# Patient Record
Sex: FEMALE | Race: BLACK OR AFRICAN AMERICAN | Employment: FULL TIME | URBAN - METROPOLITAN AREA
[De-identification: names, ages, dates, MRNs, and addresses within clinical notes are randomized per-mention and may not be internally consistent; named-entity substitution may affect disease eponyms.]

---

## 2019-11-13 ENCOUNTER — HOSPITAL ENCOUNTER (EMERGENCY)
Age: 44
Discharge: HOME | End: 2019-11-13
Attending: EMERGENCY MEDICINE
Payer: COMMERCIAL

## 2019-11-13 VITALS
BODY MASS INDEX: 31.74 KG/M2 | RESPIRATION RATE: 14 BRPM | DIASTOLIC BLOOD PRESSURE: 124 MMHG | HEIGHT: 68 IN | SYSTOLIC BLOOD PRESSURE: 164 MMHG | WEIGHT: 209.44 LBS | HEART RATE: 93 BPM | TEMPERATURE: 98 F | OXYGEN SATURATION: 98 %

## 2019-11-13 DIAGNOSIS — I10 ESSENTIAL HYPERTENSION: ICD-10-CM

## 2019-11-13 DIAGNOSIS — H01.001 BLEPHARITIS OF RIGHT UPPER EYELID, UNSPECIFIED TYPE: Primary | ICD-10-CM

## 2019-11-13 PROCEDURE — 99282 EMERGENCY DEPT VISIT SF MDM: CPT

## 2019-11-13 RX ORDER — HYDROCHLOROTHIAZIDE 25 MG/1
12.5 TABLET ORAL DAILY
Qty: 30 TABLET | Refills: 5 | Status: SHIPPED | OUTPATIENT
Start: 2019-11-13 | End: 2020-02-12 | Stop reason: SDUPTHER

## 2019-11-13 RX ORDER — AMLODIPINE BESYLATE 10 MG/1
10 TABLET ORAL DAILY
Qty: 30 TABLET | Refills: 5 | Status: SHIPPED | OUTPATIENT
Start: 2019-11-13 | End: 2020-02-12 | Stop reason: SDUPTHER

## 2019-11-13 ASSESSMENT — ENCOUNTER SYMPTOMS
EYE REDNESS: 1
SHORTNESS OF BREATH: 0
EYE DISCHARGE: 1
COUGH: 0
SORE THROAT: 0
DIARRHEA: 0
WHEEZING: 0
EYE PAIN: 0
NAUSEA: 0
RHINORRHEA: 0
ABDOMINAL PAIN: 0
BACK PAIN: 0
VOMITING: 0

## 2019-11-13 ASSESSMENT — VISUAL ACUITY
OS: 20/40
OD: 20/40

## 2020-02-12 ENCOUNTER — HOSPITAL ENCOUNTER (EMERGENCY)
Age: 45
Discharge: HOME | End: 2020-02-12
Attending: EMERGENCY MEDICINE
Payer: COMMERCIAL

## 2020-02-12 ENCOUNTER — APPOINTMENT (OUTPATIENT)
Dept: GENERAL RADIOLOGY | Age: 45
End: 2020-02-12
Payer: COMMERCIAL

## 2020-02-12 VITALS
DIASTOLIC BLOOD PRESSURE: 98 MMHG | HEART RATE: 93 BPM | WEIGHT: 205 LBS | OXYGEN SATURATION: 99 % | RESPIRATION RATE: 20 BRPM | TEMPERATURE: 97.7 F | SYSTOLIC BLOOD PRESSURE: 143 MMHG | BODY MASS INDEX: 31.07 KG/M2 | HEIGHT: 68 IN

## 2020-02-12 LAB
ANION GAP SERPL CALCULATED.3IONS-SCNC: 15 MMOL/L (ref 3–16)
BASOPHILS ABSOLUTE: 0 K/UL (ref 0–0.2)
BASOPHILS RELATIVE PERCENT: 0.6 %
BILIRUBIN URINE: NEGATIVE
BLOOD, URINE: NEGATIVE
BUN BLDV-MCNC: 16 MG/DL (ref 7–20)
CALCIUM SERPL-MCNC: 9.1 MG/DL (ref 8.3–10.6)
CHLORIDE BLD-SCNC: 103 MMOL/L (ref 99–110)
CLARITY: CLEAR
CO2: 20 MMOL/L (ref 21–32)
COLOR: YELLOW
CREAT SERPL-MCNC: 1 MG/DL (ref 0.6–1.1)
EKG ATRIAL RATE: 88 BPM
EKG DIAGNOSIS: NORMAL
EKG P AXIS: 55 DEGREES
EKG P-R INTERVAL: 152 MS
EKG Q-T INTERVAL: 364 MS
EKG QRS DURATION: 80 MS
EKG QTC CALCULATION (BAZETT): 440 MS
EKG R AXIS: 44 DEGREES
EKG T AXIS: -2 DEGREES
EKG VENTRICULAR RATE: 88 BPM
EOSINOPHILS ABSOLUTE: 0.1 K/UL (ref 0–0.6)
EOSINOPHILS RELATIVE PERCENT: 1.2 %
GFR AFRICAN AMERICAN: >60
GFR NON-AFRICAN AMERICAN: 59
GLUCOSE BLD-MCNC: 134 MG/DL (ref 70–99)
GLUCOSE URINE: NEGATIVE MG/DL
HCG QUALITATIVE: NEGATIVE
HCT VFR BLD CALC: 42.7 % (ref 36–48)
HEMOGLOBIN: 14.7 G/DL (ref 12–16)
KETONES, URINE: NEGATIVE MG/DL
LEUKOCYTE ESTERASE, URINE: NEGATIVE
LYMPHOCYTES ABSOLUTE: 2.6 K/UL (ref 1–5.1)
LYMPHOCYTES RELATIVE PERCENT: 36.5 %
MCH RBC QN AUTO: 34.5 PG (ref 26–34)
MCHC RBC AUTO-ENTMCNC: 34.3 G/DL (ref 31–36)
MCV RBC AUTO: 100.6 FL (ref 80–100)
MICROSCOPIC EXAMINATION: NORMAL
MONOCYTES ABSOLUTE: 0.4 K/UL (ref 0–1.3)
MONOCYTES RELATIVE PERCENT: 5.5 %
NEUTROPHILS ABSOLUTE: 4 K/UL (ref 1.7–7.7)
NEUTROPHILS RELATIVE PERCENT: 56.2 %
NITRITE, URINE: NEGATIVE
PDW BLD-RTO: 13.7 % (ref 12.4–15.4)
PH UA: 6 (ref 5–8)
PLATELET # BLD: 220 K/UL (ref 135–450)
PMV BLD AUTO: 9.5 FL (ref 5–10.5)
POTASSIUM REFLEX MAGNESIUM: 3.9 MMOL/L (ref 3.5–5.1)
PROTEIN UA: NEGATIVE MG/DL
RBC # BLD: 4.25 M/UL (ref 4–5.2)
SODIUM BLD-SCNC: 138 MMOL/L (ref 136–145)
SPECIFIC GRAVITY UA: 1.02 (ref 1–1.03)
TROPONIN: <0.01 NG/ML
URINE REFLEX TO CULTURE: NORMAL
URINE TYPE: NORMAL
UROBILINOGEN, URINE: 0.2 E.U./DL
WBC # BLD: 7.2 K/UL (ref 4–11)

## 2020-02-12 PROCEDURE — 84703 CHORIONIC GONADOTROPIN ASSAY: CPT

## 2020-02-12 PROCEDURE — 80048 BASIC METABOLIC PNL TOTAL CA: CPT

## 2020-02-12 PROCEDURE — 84484 ASSAY OF TROPONIN QUANT: CPT

## 2020-02-12 PROCEDURE — 85025 COMPLETE CBC W/AUTO DIFF WBC: CPT

## 2020-02-12 PROCEDURE — 99285 EMERGENCY DEPT VISIT HI MDM: CPT

## 2020-02-12 PROCEDURE — 2580000003 HC RX 258: Performed by: PHYSICIAN ASSISTANT

## 2020-02-12 PROCEDURE — 71046 X-RAY EXAM CHEST 2 VIEWS: CPT

## 2020-02-12 PROCEDURE — 6370000000 HC RX 637 (ALT 250 FOR IP): Performed by: PHYSICIAN ASSISTANT

## 2020-02-12 PROCEDURE — 81003 URINALYSIS AUTO W/O SCOPE: CPT

## 2020-02-12 PROCEDURE — 93005 ELECTROCARDIOGRAM TRACING: CPT | Performed by: PHYSICIAN ASSISTANT

## 2020-02-12 RX ORDER — AMLODIPINE BESYLATE 10 MG/1
10 TABLET ORAL DAILY
Qty: 30 TABLET | Refills: 5 | Status: SHIPPED | OUTPATIENT
Start: 2020-02-12

## 2020-02-12 RX ORDER — 0.9 % SODIUM CHLORIDE 0.9 %
1000 INTRAVENOUS SOLUTION INTRAVENOUS ONCE
Status: COMPLETED | OUTPATIENT
Start: 2020-02-12 | End: 2020-02-12

## 2020-02-12 RX ORDER — IBUPROFEN 400 MG/1
800 TABLET ORAL ONCE
Status: COMPLETED | OUTPATIENT
Start: 2020-02-12 | End: 2020-02-12

## 2020-02-12 RX ORDER — ADENOSINE 3 MG/ML
6 INJECTION, SOLUTION INTRAVENOUS ONCE
Status: DISCONTINUED | OUTPATIENT
Start: 2020-02-12 | End: 2020-02-12

## 2020-02-12 RX ORDER — ADENOSINE 3 MG/ML
INJECTION, SOLUTION INTRAVENOUS
Status: DISCONTINUED
Start: 2020-02-12 | End: 2020-02-12 | Stop reason: HOSPADM

## 2020-02-12 RX ORDER — HYDROCHLOROTHIAZIDE 25 MG/1
12.5 TABLET ORAL DAILY
Qty: 30 TABLET | Refills: 5 | Status: SHIPPED | OUTPATIENT
Start: 2020-02-12

## 2020-02-12 RX ADMIN — IBUPROFEN 800 MG: 400 TABLET, FILM COATED ORAL at 11:43

## 2020-02-12 RX ADMIN — SODIUM CHLORIDE 1000 ML: 9 INJECTION, SOLUTION INTRAVENOUS at 09:58

## 2020-02-12 ASSESSMENT — ENCOUNTER SYMPTOMS
EYE PAIN: 0
ABDOMINAL PAIN: 0
CHEST TIGHTNESS: 1
SHORTNESS OF BREATH: 0
BACK PAIN: 0
COUGH: 0
NAUSEA: 0
VOMITING: 0
DIARRHEA: 0
PHOTOPHOBIA: 0

## 2020-02-12 ASSESSMENT — PAIN SCALES - GENERAL: PAINLEVEL_OUTOF10: 8

## 2020-02-12 NOTE — ED NOTES
Peripheral IV placed. Positive blood return and flushed with 10 ml sterile saline without difficulty. Patient tolerated procedure well. Site intact with no redness, swelling, or pain at site.         Crista Patricia RN  02/12/20 1885

## 2020-02-12 NOTE — ED PROVIDER NOTES
Family, and Social History     She has a past medical history of Hypertension. She has no past surgical history on file. Her family history is not on file. She reports that she has been smoking. She has a 12.00 pack-year smoking history. She has never used smokeless tobacco. She reports that she does not drink alcohol or use drugs. Medications     Discharge Medication List as of 2/12/2020 12:57 PM          Allergies     She has No Known Allergies. Physical Exam     INITIAL VITALS: BP: (!) 155/101, Temp: 97.7 °F (36.5 °C), Pulse: 130, Resp: 15, SpO2: 100 %  Physical Exam  Constitutional:       General: She is not in acute distress. Appearance: She is well-developed. HENT:      Head: Normocephalic and atraumatic. Eyes:      Pupils: Pupils are equal, round, and reactive to light. Neck:      Musculoskeletal: Normal range of motion and neck supple. Cardiovascular:      Rate and Rhythm: Regular rhythm. Tachycardia present. Heart sounds: No murmur. No friction rub. No gallop. Pulmonary:      Effort: Pulmonary effort is normal. No respiratory distress. Breath sounds: Normal breath sounds. Abdominal:      Palpations: Abdomen is soft. Tenderness: There is no abdominal tenderness. Musculoskeletal: Normal range of motion. Skin:     General: Skin is warm and dry. Neurological:      Mental Status: She is alert and oriented to person, place, and time. Diagnostic Results     EKG   Interpreted in conjunction with emergency department physician Ian Mcmillan, *  Rhythm: paroxismal supraventricular tachycardia  Rate: tachycardia and 140-150  Axis: normal  Ectopy: none  Conduction: normal  ST Segments: normal  T Waves: normal  Q Waves:nonspecific  Clinical Impression: supraventricular tachycardia  Comparison:  None    Repeat EKG was obtained at 10:51 AM and showed normal sinus rhythm with a ventricular rate of 88.   No ST or T wave changes concerning for ischemia.     RADIOLOGY:  XR CHEST STANDARD (2 VW)   Final Result      No acute findings          LABS:   Results for orders placed or performed during the hospital encounter of 02/12/20   Troponin   Result Value Ref Range    Troponin <0.01 <0.01 ng/mL   Basic Metabolic Panel w/ Reflex to MG   Result Value Ref Range    Sodium 138 136 - 145 mmol/L    Potassium reflex Magnesium 3.9 3.5 - 5.1 mmol/L    Chloride 103 99 - 110 mmol/L    CO2 20 (L) 21 - 32 mmol/L    Anion Gap 15 3 - 16    Glucose 134 (H) 70 - 99 mg/dL    BUN 16 7 - 20 mg/dL    CREATININE 1.0 0.6 - 1.1 mg/dL    GFR Non- 59 (A) >60    GFR African American >60 >60    Calcium 9.1 8.3 - 10.6 mg/dL   CBC Auto Differential   Result Value Ref Range    WBC 7.2 4.0 - 11.0 K/uL    RBC 4.25 4.00 - 5.20 M/uL    Hemoglobin 14.7 12.0 - 16.0 g/dL    Hematocrit 42.7 36.0 - 48.0 %    .6 (H) 80.0 - 100.0 fL    MCH 34.5 (H) 26.0 - 34.0 pg    MCHC 34.3 31.0 - 36.0 g/dL    RDW 13.7 12.4 - 15.4 %    Platelets 565 362 - 967 K/uL    MPV 9.5 5.0 - 10.5 fL    Neutrophils % 56.2 %    Lymphocytes % 36.5 %    Monocytes % 5.5 %    Eosinophils % 1.2 %    Basophils % 0.6 %    Neutrophils Absolute 4.0 1.7 - 7.7 K/uL    Lymphocytes Absolute 2.6 1.0 - 5.1 K/uL    Monocytes Absolute 0.4 0.0 - 1.3 K/uL    Eosinophils Absolute 0.1 0.0 - 0.6 K/uL    Basophils Absolute 0.0 0.0 - 0.2 K/uL   Urinalysis Reflex to Culture   Result Value Ref Range    Color, UA Yellow Straw/Yellow    Clarity, UA Clear Clear    Glucose, Ur Negative Negative mg/dL    Bilirubin Urine Negative Negative    Ketones, Urine Negative Negative mg/dL    Specific Gravity, UA 1.020 1.005 - 1.030    Blood, Urine Negative Negative    pH, UA 6.0 5.0 - 8.0    Protein, UA Negative Negative mg/dL    Urobilinogen, Urine 0.2 <2.0 E.U./dL    Nitrite, Urine Negative Negative    Leukocyte Esterase, Urine Negative Negative    Microscopic Examination Not Indicated     Urine Type NotGiven     Urine Reflex to Culture Not Indicated    HCG Qualitative, Serum   Result Value Ref Range    hCG Qual Negative Detects HCG level >10 MIU/mL   EKG 12 Lead   Result Value Ref Range    Ventricular Rate 88 BPM    Atrial Rate 88 BPM    P-R Interval 152 ms    QRS Duration 80 ms    Q-T Interval 364 ms    QTc Calculation (Bazett) 440 ms    P Axis 55 degrees    R Axis 44 degrees    T Axis -2 degrees    Diagnosis       EKG performed in ER and to be interpreted by ER physician. Confirmed by MD, ER ([id number]),  Vish Rangel ([id number]) on 2/12/2020 10:58:52 AM     RECENT VITALS:  BP: (!) 143/98, Temp: 97.7 °F (36.5 °C), Pulse: 93, Resp: 20, SpO2: 99 %     ED Course     Nursing Notes, Past Medical Hx,Past Surgical Hx, Social Hx, Allergies, and Family Hx were reviewed. The patient was given the following medications:  Orders Placed This Encounter   Medications    DISCONTD: adenosine (ADENOCARD) 6 MG/2ML injection     MARY ALLEN: cabinet override    0.9 % sodium chloride bolus    DISCONTD: adenosine (ADENOCARD) injection 6 mg    ibuprofen (ADVIL;MOTRIN) tablet 800 mg    amLODIPine (NORVASC) 10 MG tablet     Sig: Take 1 tablet by mouth daily     Dispense:  30 tablet     Refill:  5    hydrochlorothiazide (HYDRODIURIL) 25 MG tablet     Sig: Take 0.5 tablets by mouth daily     Dispense:  30 tablet     Refill:  5       CONSULTS:  None    MEDICAL DECISION MAKING / ASSESSMENT / Tishgaurav Alvarenga is a 53 y.o. female who presents the emergency department with palpitations, lightheadedness, and chest tightness. Patient was tachycardic to 140-150 on presentation, all other vital signs were stable. Thorough history and physical exam was performed as detailed above. Patient presents the emergency department with sudden onset palpitations, lightheadedness, and chest tightness while at work this morning.   Upon my assessment she states all her symptoms have resolved, however she is still tachycardic up to 150 bpm.  She denies any history of similar symptoms in the past.  She states she has been under a lot of stress recently. Denies any significant caffeine use. Denies any history of cardiac disease. Patient does have a history of hypertension, however has not had her medication in the last month. EKG was initially concerning for supraventricular tachycardia with a heart rate of 149 bpm.  CBC, BMP, troponin, chest x-ray, serum pregnancy, urinalysis all came back unremarkable. I initially ordered 1 L of fluids along with 6 mg of adenosine given the patient's SVT. Shortly after fluids were started, she converted spontaneously back to sinus rhythm. Adenosine did not have to be given. Repeat EKG showed normal sinus rhythm with no ST or T wave changes concerning for ischemia with a heart rate of 88. Patient was observed for an extended period of time after she converted to normal sinus rhythm without any recurrence of supraventricular tachycardia. At this time, I believe the patient is stable to be discharged to follow-up with cardiology as an outpatient. She was given the contact information for the on-call cardiologist today. She was told to follow-up with her primary care provider as an outpatient to discuss her symptoms. She was given refills of both of her blood pressure medications. The plan was thoroughly discussed with the patient she is agreeable at this time. She was given strict return precautions and discharged home. This patient was also evaluated by the attending physician. All care plans were discussed and agreed upon. Clinical Impression     1.  Paroxysmal supraventricular tachycardia [facility])        Disposition     PATIENT REFERRED TO:  [facility] Emergency Department  [address] [place] [id number]  [phone]  Go to   If symptoms worsen    Sergei Dubois MD  [address] [facility],[address]  [phone]    Schedule an appointment as soon as possible for a visit         DISCHARGE MEDICATIONS:  Discharge Medication List as of 2/12/2020 12:57 PM          DISPOSITION Decision To Discharge 02/12/2020 12:47:54 PM        Violeta Sánchez PA-C  02/12/20 1740

## 2025-04-15 ENCOUNTER — HOSPITAL ENCOUNTER (INPATIENT)
Age: 50
LOS: 1 days | Discharge: HOME | DRG: 310 | End: 2025-04-17
Attending: EMERGENCY MEDICINE | Admitting: INTERNAL MEDICINE

## 2025-04-15 DIAGNOSIS — I47.10 SVT (SUPRAVENTRICULAR TACHYCARDIA): Primary | ICD-10-CM

## 2025-04-15 DIAGNOSIS — R94.31 ABNORMAL ELECTROCARDIOGRAPHY: ICD-10-CM

## 2025-04-15 DIAGNOSIS — R79.89 TROPONIN LEVEL ELEVATED: ICD-10-CM

## 2025-04-15 PROBLEM — R07.9 CHEST PAIN: Status: ACTIVE | Noted: 2025-04-15

## 2025-04-15 LAB
ALBUMIN SERPL-MCNC: 3.6 G/DL (ref 3.4–5)
ALBUMIN/GLOB SERPL: 1.2 {RATIO} (ref 1.1–2.2)
ALP SERPL-CCNC: 92 U/L (ref 40–129)
ALT SERPL-CCNC: 10 U/L (ref 10–40)
ANION GAP SERPL CALCULATED.3IONS-SCNC: 11 MMOL/L (ref 3–16)
AST SERPL-CCNC: 17 U/L (ref 15–37)
BASOPHILS # BLD: 0.1 K/UL (ref 0–0.2)
BASOPHILS NFR BLD: 0.7 %
BILIRUB SERPL-MCNC: <0.2 MG/DL (ref 0–1)
BUN SERPL-MCNC: 21 MG/DL (ref 7–20)
CALCIUM SERPL-MCNC: 9.1 MG/DL (ref 8.3–10.6)
CHLORIDE SERPL-SCNC: 110 MMOL/L (ref 99–110)
CO2 SERPL-SCNC: 19 MMOL/L (ref 21–32)
CREAT SERPL-MCNC: 1.2 MG/DL (ref 0.6–1.1)
DEPRECATED RDW RBC AUTO: 14.1 % (ref 12.4–15.4)
EKG ATRIAL RATE: 136 BPM
EKG ATRIAL RATE: 94 BPM
EKG DIAGNOSIS: NORMAL
EKG DIAGNOSIS: NORMAL
EKG P AXIS: 48 DEGREES
EKG P-R INTERVAL: 146 MS
EKG Q-T INTERVAL: 302 MS
EKG Q-T INTERVAL: 354 MS
EKG QRS DURATION: 76 MS
EKG QRS DURATION: 78 MS
EKG QTC CALCULATION (BAZETT): 442 MS
EKG QTC CALCULATION (BAZETT): 462 MS
EKG R AXIS: 21 DEGREES
EKG R AXIS: 40 DEGREES
EKG T AXIS: 1 DEGREES
EKG T AXIS: 29 DEGREES
EKG VENTRICULAR RATE: 141 BPM
EKG VENTRICULAR RATE: 94 BPM
EOSINOPHIL # BLD: 0.1 K/UL (ref 0–0.6)
EOSINOPHIL NFR BLD: 1.6 %
GFR SERPLBLD CREATININE-BSD FMLA CKD-EPI: 54 ML/MIN/{1.73_M2}
GLUCOSE BLD-MCNC: 103 MG/DL (ref 70–99)
GLUCOSE SERPL-MCNC: 113 MG/DL (ref 70–99)
HCT VFR BLD AUTO: 42.9 % (ref 36–48)
HGB BLD-MCNC: 14.7 G/DL (ref 12–16)
LYMPHOCYTES # BLD: 3.5 K/UL (ref 1–5.1)
LYMPHOCYTES NFR BLD: 39.1 %
MCH RBC QN AUTO: 34.7 PG (ref 26–34)
MCHC RBC AUTO-ENTMCNC: 34.2 G/DL (ref 31–36)
MCV RBC AUTO: 101.4 FL (ref 80–100)
MONOCYTES # BLD: 0.6 K/UL (ref 0–1.3)
MONOCYTES NFR BLD: 6.3 %
NEUTROPHILS # BLD: 4.7 K/UL (ref 1.7–7.7)
NEUTROPHILS NFR BLD: 52.3 %
PERFORMED ON: ABNORMAL
PLATELET # BLD AUTO: 215 K/UL (ref 135–450)
PMV BLD AUTO: 9.5 FL (ref 5–10.5)
POTASSIUM SERPL-SCNC: 3.9 MMOL/L (ref 3.5–5.1)
PROT SERPL-MCNC: 6.7 G/DL (ref 6.4–8.2)
RBC # BLD AUTO: 4.23 M/UL (ref 4–5.2)
SODIUM SERPL-SCNC: 140 MMOL/L (ref 136–145)
TROPONIN, HIGH SENSITIVITY: 10 NG/L (ref 0–14)
TROPONIN, HIGH SENSITIVITY: 26 NG/L (ref 0–14)
TROPONIN, HIGH SENSITIVITY: 26 NG/L (ref 0–14)
TROPONIN, HIGH SENSITIVITY: 40 NG/L (ref 0–14)
WBC # BLD AUTO: 8.9 K/UL (ref 4–11)

## 2025-04-15 PROCEDURE — G0378 HOSPITAL OBSERVATION PER HR: HCPCS

## 2025-04-15 PROCEDURE — 93005 ELECTROCARDIOGRAM TRACING: CPT | Performed by: NURSE PRACTITIONER

## 2025-04-15 PROCEDURE — 2500000003 HC RX 250 WO HCPCS: Performed by: INTERNAL MEDICINE

## 2025-04-15 PROCEDURE — 6360000002 HC RX W HCPCS: Performed by: NURSE PRACTITIONER

## 2025-04-15 PROCEDURE — 84484 ASSAY OF TROPONIN QUANT: CPT

## 2025-04-15 PROCEDURE — 85025 COMPLETE CBC W/AUTO DIFF WBC: CPT

## 2025-04-15 PROCEDURE — 80053 COMPREHEN METABOLIC PANEL: CPT

## 2025-04-15 PROCEDURE — 99285 EMERGENCY DEPT VISIT HI MDM: CPT

## 2025-04-15 PROCEDURE — 6360000002 HC RX W HCPCS: Performed by: INTERNAL MEDICINE

## 2025-04-15 PROCEDURE — 96374 THER/PROPH/DIAG INJ IV PUSH: CPT

## 2025-04-15 PROCEDURE — 2580000003 HC RX 258: Performed by: NURSE PRACTITIONER

## 2025-04-15 RX ORDER — ENOXAPARIN SODIUM 100 MG/ML
40 INJECTION SUBCUTANEOUS DAILY
Status: DISCONTINUED | OUTPATIENT
Start: 2025-04-15 | End: 2025-04-17 | Stop reason: HOSPADM

## 2025-04-15 RX ORDER — ACETAMINOPHEN 325 MG/1
650 TABLET ORAL EVERY 6 HOURS PRN
Status: DISCONTINUED | OUTPATIENT
Start: 2025-04-15 | End: 2025-04-17 | Stop reason: HOSPADM

## 2025-04-15 RX ORDER — ACETAMINOPHEN 650 MG/1
650 SUPPOSITORY RECTAL EVERY 6 HOURS PRN
Status: DISCONTINUED | OUTPATIENT
Start: 2025-04-15 | End: 2025-04-17 | Stop reason: HOSPADM

## 2025-04-15 RX ORDER — POTASSIUM CHLORIDE 1500 MG/1
40 TABLET, EXTENDED RELEASE ORAL PRN
Status: DISCONTINUED | OUTPATIENT
Start: 2025-04-15 | End: 2025-04-17 | Stop reason: HOSPADM

## 2025-04-15 RX ORDER — AMLODIPINE BESYLATE 10 MG/1
10 TABLET ORAL DAILY
Status: DISCONTINUED | OUTPATIENT
Start: 2025-04-16 | End: 2025-04-16

## 2025-04-15 RX ORDER — SODIUM CHLORIDE 0.9 % (FLUSH) 0.9 %
5-40 SYRINGE (ML) INJECTION EVERY 12 HOURS SCHEDULED
Status: DISCONTINUED | OUTPATIENT
Start: 2025-04-15 | End: 2025-04-17 | Stop reason: HOSPADM

## 2025-04-15 RX ORDER — ONDANSETRON 4 MG/1
4 TABLET, ORALLY DISINTEGRATING ORAL EVERY 8 HOURS PRN
Status: DISCONTINUED | OUTPATIENT
Start: 2025-04-15 | End: 2025-04-17 | Stop reason: HOSPADM

## 2025-04-15 RX ORDER — MAGNESIUM SULFATE IN WATER 40 MG/ML
2000 INJECTION, SOLUTION INTRAVENOUS PRN
Status: DISCONTINUED | OUTPATIENT
Start: 2025-04-15 | End: 2025-04-17 | Stop reason: HOSPADM

## 2025-04-15 RX ORDER — LABETALOL HYDROCHLORIDE 5 MG/ML
10 INJECTION, SOLUTION INTRAVENOUS
Status: COMPLETED | OUTPATIENT
Start: 2025-04-15 | End: 2025-04-15

## 2025-04-15 RX ORDER — SODIUM CHLORIDE, SODIUM LACTATE, POTASSIUM CHLORIDE, AND CALCIUM CHLORIDE .6; .31; .03; .02 G/100ML; G/100ML; G/100ML; G/100ML
1000 INJECTION, SOLUTION INTRAVENOUS ONCE
Status: COMPLETED | OUTPATIENT
Start: 2025-04-15 | End: 2025-04-15

## 2025-04-15 RX ORDER — ADENOSINE 3 MG/ML
6 INJECTION, SOLUTION INTRAVENOUS ONCE
Status: COMPLETED | OUTPATIENT
Start: 2025-04-15 | End: 2025-04-15

## 2025-04-15 RX ORDER — ONDANSETRON 2 MG/ML
4 INJECTION INTRAMUSCULAR; INTRAVENOUS EVERY 6 HOURS PRN
Status: DISCONTINUED | OUTPATIENT
Start: 2025-04-15 | End: 2025-04-17 | Stop reason: HOSPADM

## 2025-04-15 RX ORDER — ASPIRIN 81 MG/1
81 TABLET, CHEWABLE ORAL DAILY
Status: DISCONTINUED | OUTPATIENT
Start: 2025-04-16 | End: 2025-04-17 | Stop reason: HOSPADM

## 2025-04-15 RX ORDER — SODIUM CHLORIDE 9 MG/ML
INJECTION, SOLUTION INTRAVENOUS PRN
Status: DISCONTINUED | OUTPATIENT
Start: 2025-04-15 | End: 2025-04-17 | Stop reason: HOSPADM

## 2025-04-15 RX ORDER — SODIUM CHLORIDE 0.9 % (FLUSH) 0.9 %
5-40 SYRINGE (ML) INJECTION PRN
Status: DISCONTINUED | OUTPATIENT
Start: 2025-04-15 | End: 2025-04-17 | Stop reason: HOSPADM

## 2025-04-15 RX ORDER — HYDROCHLOROTHIAZIDE 25 MG/1
12.5 TABLET ORAL DAILY
Status: DISCONTINUED | OUTPATIENT
Start: 2025-04-16 | End: 2025-04-17 | Stop reason: HOSPADM

## 2025-04-15 RX ORDER — POLYETHYLENE GLYCOL 3350 17 G/17G
17 POWDER, FOR SOLUTION ORAL DAILY PRN
Status: DISCONTINUED | OUTPATIENT
Start: 2025-04-15 | End: 2025-04-17 | Stop reason: HOSPADM

## 2025-04-15 RX ORDER — POTASSIUM CHLORIDE 7.45 MG/ML
10 INJECTION INTRAVENOUS PRN
Status: DISCONTINUED | OUTPATIENT
Start: 2025-04-15 | End: 2025-04-17 | Stop reason: HOSPADM

## 2025-04-15 RX ADMIN — ADENOSINE 6 MG: 3 INJECTION, SOLUTION INTRAVENOUS at 14:25

## 2025-04-15 RX ADMIN — SODIUM CHLORIDE, SODIUM LACTATE, POTASSIUM CHLORIDE, AND CALCIUM CHLORIDE 1000 ML: .6; .31; .03; .02 INJECTION, SOLUTION INTRAVENOUS at 14:16

## 2025-04-15 RX ADMIN — SODIUM CHLORIDE, SODIUM LACTATE, POTASSIUM CHLORIDE, AND CALCIUM CHLORIDE 1000 ML: .6; .31; .03; .02 INJECTION, SOLUTION INTRAVENOUS at 16:03

## 2025-04-15 RX ADMIN — LABETALOL HYDROCHLORIDE 10 MG: 5 INJECTION, SOLUTION INTRAVENOUS at 21:22

## 2025-04-15 RX ADMIN — SODIUM CHLORIDE, PRESERVATIVE FREE 10 ML: 5 INJECTION INTRAVENOUS at 21:21

## 2025-04-15 RX ADMIN — ENOXAPARIN SODIUM 40 MG: 100 INJECTION SUBCUTANEOUS at 21:32

## 2025-04-15 ASSESSMENT — PAIN SCALES - GENERAL: PAINLEVEL_OUTOF10: 6

## 2025-04-15 ASSESSMENT — PAIN - FUNCTIONAL ASSESSMENT: PAIN_FUNCTIONAL_ASSESSMENT: 0-10

## 2025-04-15 ASSESSMENT — PAIN DESCRIPTION - LOCATION: LOCATION: CHEST

## 2025-04-15 NOTE — ED NOTES
Attending and NP bedside, reviewed cardioversion with patient. Crash cart bedside and pt on zoll.      Anne Matias, RN  04/15/25 8548

## 2025-04-15 NOTE — ED PROVIDER NOTES
THE [facility]  EMERGENCY DEPARTMENT ENCOUNTER          PHYSICIAN ASSISTANT NOTE     Date of evaluation: 4/15/2025    ADDENDUM:      Care of this patient was assumed from Rayne Bender NP.  The patient was seen for Tachycardia (T presents to the ED due to palpitations and tachycardia. Pt states she was at work and then felt her heart rcaing. Pt states this has happened in the  past. PMH of HBP, pt is on medications but is having issues with her insurance. )  .  The patient's initial evaluation and plan have been discussed with the prior provider who initially evaluated the patient.  Nursing Notes, Past Medical Hx, Past Surgical Hx, Social Hx, Allergies, and Family Hx were all reviewed.    ASSESSMENT / PLAN  (MEDICAL DECISION MAKING)     Simi Iglesias is a 50 y.o. female with a PMH of HTN and SVT.  At time of signout, repeat troponin pending.  Ultimately patient arrived in SVT.  She received 6 mg of adenosine which improved her rate and has been controlled since.  Troponins increased from 10 up to 26.  She then received a liter of fluids.  After her liter of fluids an additional troponin was ordered and increased to 40.     Patient has not had previous cardiac workup.  At this time, patient will be admitted for cardiac workup and  trend of troponin.  Patient is agreeable to admission.  Patient will be cared for in the ER until bed becomes available upstairs.    Is this patient to be included in the SEP-1 core measure? No Exclusion criteria - the patient is NOT to be included for SEP-1 Core Measure due to: Infection is not suspected    Medical Decision Making  Amount and/or Complexity of Data Reviewed  Labs: ordered.  ECG/medicine tests: ordered.    Risk  Prescription drug management.  Decision regarding hospitalization.        This patient was also evaluated by the attending physician. All care plans were discussed and agreed upon.    Clinical Impression     1. SVT (supraventricular tachycardia)    2.

## 2025-04-15 NOTE — ED NOTES
Patient Name: Simi Iglesias  : 1975 50 y.o.  MRN: [id number]  ED Room #: A04/A04-04     Chief complaint:   Chief Complaint   Patient presents with    Tachycardia     T presents to the ED due to palpitations and tachycardia. Pt states she was at work and then felt her heart rcaing. Pt states this has happened in the  past. PMH of [facility], pt is on medications but is having issues with her insurance.      Hospital Problem/Diagnosis:   Hospital Problems           Last Modified POA    * (Principal) Chest pain 4/15/2025 Yes         O2 Flow Rate:O2 Device: None (Room air)   (if applicable)  Cardiac Rhythm:   (if applicable)  Active LDA's:   Peripheral IV 04/15/25 Right Forearm (Active)   Site Assessment Clean, dry & intact 04/15/25 132   Line Status Blood return noted 04/15/25 1325            How does patient ambulate? Low Fall Risk (Ambulates by themselves without support    2. How does patient take pills? Whole with Water    3. Is patient alert? Alert    4. Is patient oriented? To Person, To Place, To Time, To Situation, and Follows Commands    5.   Patient arrived from:  home  Facility Name: ___________________________________________    6. If patient is disoriented or from a Skill Nursing Facility has family been notified of admission?  NA    7. Patient belongings? Belongings: Cell Phone, Wallet, and Clothing    Disposition of belongings? Kept with Patient     8. Any specific patient or family belongings/needs/dynamics?   a. NA    9. Miscellaneous comments/pending orders?  a. NA     If there are any additional questions please reach out to the Emergency Department.      Simba Diaz, RN  04/15/25 1953

## 2025-04-15 NOTE — ED NOTES
Provider notified of SVT on EKG. Provider bedside for assessment.      Anne Matias, RN  04/15/25 8048

## 2025-04-15 NOTE — ED PROVIDER NOTES
ED Attending Attestation Note     Date of evaluation: 4/15/2025    This patient was seen by the resident.  I have seen and examined the patient, agree with the workup, evaluation, management and diagnosis. The care plan has been discussed.  I have reviewed the ECG and concur with the resident's interpretation.      Briefly, Simi Iglesias is a 50 y.o. female presenting for evaluation of palpitations.  Has had SVT previously.    Notable exam findings include tachycardia with rate approximately 150.  SVT on EKG.  Blood pressure stable.  Normal mental status.    Assessment/ Medical Decision Making:     SVT did not resolve with vagal maneuvers.  I have reviewed her medical record.  Patient has received adenosine previously and does not have evidence on Chago-Parkinson-White on her baseline EKG.  She was given 6 mg of adenosine with conversion to sinus rhythm. I supervised this procedure.  Troponin mildly elevated but believe this is secondary to demand ischemia from prolonged tachycardia.  Repeat is pending.       Nikita Garcia MD  04/17/25 2125

## 2025-04-15 NOTE — ED PROVIDER NOTES
THE [facility]  EMERGENCY DEPARTMENT ENCOUNTER          NURSE PRACTITIONER NOTE     Date of evaluation: 4/15/2025    Chief Complaint     Tachycardia (T presents to the ED due to palpitations and tachycardia. Pt states she was at work and then felt her heart rcaing. Pt states this has happened in the  past. PMH of [facility], pt is on medications but is having issues with her insurance. )    History of Present Illness     Siim Iglesias is a 50 y.o. female with a past medical history of hypertension and SVT, previously requiring adenosine, but has had SVT in several years who presents to the emergency department with complaints of palpitations.  Started when she was trying to warm up her food at work.  Complains of feeling palpitations, feeling like her heart is racing out of her chest, and mild anxiety, but denies any chest pain, shortness of breath, dizziness, lightheadedness, syncope or near syncope.  Reports she has been in her normal state of health with no recent viral symptoms, nausea, vomiting, diarrhea, fevers, chills or other symptomatology.    With the exception of the above, there are no aggravating or alleviating factors.    Review of Systems     Pertinent positive and negative findings as documented above in HPI, otherwise all other systems were reviewed and negative     Past Medical, Surgical, Family, and Social History     Medical History:   Past Medical History:   Diagnosis Date    Hypertension        Surgical History: No past surgical history on file.    Social History: She reports that she has been smoking. She has a 12 pack-year smoking history. She has never used smokeless tobacco. She reports that she does not drink alcohol and does not use drugs.    Family History: Her family history is not on file.    Medications     Previous Medications    AMLODIPINE (NORVASC) 10 MG TABLET    Take 1 tablet by mouth daily    HYDROCHLOROTHIAZIDE (HYDRODIURIL) 25 MG TABLET    Take 0.5 tablets by mouth daily     Eosinophils Absolute 0.1 0.0 - 0.6 K/uL    Basophils Absolute 0.1 0.0 - 0.2 K/uL   Comprehensive Metabolic Panel w/ Reflex to MG   Result Value Ref Range    Sodium 140 136 - 145 mmol/L    Potassium reflex Magnesium 3.9 3.5 - 5.1 mmol/L    Chloride 110 99 - 110 mmol/L    CO2 19 (L) 21 - 32 mmol/L    Anion Gap 11 3 - 16    Glucose 113 (H) 70 - 99 mg/dL    BUN 21 (H) 7 - 20 mg/dL    Creatinine 1.2 (H) 0.6 - 1.1 mg/dL    Est, Glom Filt Rate 54 (A) >60    Calcium 9.1 8.3 - 10.6 mg/dL    Total Protein 6.7 6.4 - 8.2 g/dL    Albumin 3.6 3.4 - 5.0 g/dL    Albumin/Globulin Ratio 1.2 1.1 - 2.2    Total Bilirubin <0.2 0.0 - 1.0 mg/dL    Alkaline Phosphatase 92 40 - 129 U/L    ALT 10 10 - 40 U/L    AST 17 15 - 37 U/L   Troponin   Result Value Ref Range    Troponin, High Sensitivity 10 0 - 14 ng/L   Troponin   Result Value Ref Range    Troponin, High Sensitivity 26 (H) 0 - 14 ng/L   EKG 12 Lead   Result Value Ref Range    Ventricular Rate 141 BPM    Atrial Rate 136 BPM    QRS Duration 76 ms    Q-T Interval 302 ms    QTc Calculation (Bazett) 462 ms    R Axis 40 degrees    T Axis 29 degrees    Diagnosis        Poor data quality, interpretation may be adversely affectedSupraventricular tachycardiaNonspecific ST abnormalityAbnormal ECGConfirmed by MD, ER ([id number]),  SRUTHI ENG ([id number]) on 4/15/2025 4:12:13 PM   EKG 12 Lead   Result Value Ref Range    Ventricular Rate 94 BPM    Atrial Rate 94 BPM    P-R Interval 146 ms    QRS Duration 78 ms    Q-T Interval 354 ms    QTc Calculation (Bazett) 442 ms    P Axis 48 degrees    R Axis 21 degrees    T Axis 1 degrees    Diagnosis       Normal sinus rhythm with sinus arrhythmiaNonspecific ST and T wave abnormalityAbnormal ECGConfirmed by MD, ER ([id number]),  SRUTHI ENG ([id number]) on 4/15/2025 4:12:16 PM       RECENT VITALS:  BP: (!) 139/97, Temp: 97.4 °F (36.3 °C), Pulse: 70, Respirations: 19, SpO2: 98 %     Procedures     Procedures  None     ED Course     Nursing Notes,

## 2025-04-16 ENCOUNTER — APPOINTMENT (OUTPATIENT)
Age: 50
DRG: 310 | End: 2025-04-16
Attending: INTERNAL MEDICINE

## 2025-04-16 PROBLEM — I47.10 SVT (SUPRAVENTRICULAR TACHYCARDIA): Status: ACTIVE | Noted: 2025-04-16

## 2025-04-16 LAB
ANION GAP SERPL CALCULATED.3IONS-SCNC: 10 MMOL/L (ref 3–16)
BUN SERPL-MCNC: 14 MG/DL (ref 7–20)
CALCIUM SERPL-MCNC: 8.7 MG/DL (ref 8.3–10.6)
CHLORIDE SERPL-SCNC: 108 MMOL/L (ref 99–110)
CHOLEST SERPL-MCNC: 192 MG/DL (ref 0–199)
CO2 SERPL-SCNC: 19 MMOL/L (ref 21–32)
CREAT SERPL-MCNC: 0.9 MG/DL (ref 0.6–1.1)
DEPRECATED RDW RBC AUTO: 14.1 % (ref 12.4–15.4)
ECHO AO ASC DIAM: 3.9 CM
ECHO AO ASCENDING AORTA INDEX: 1.87 CM/M2
ECHO AO ROOT DIAM: 3.6 CM
ECHO AO ROOT INDEX: 1.72 CM/M2
ECHO AV AREA PEAK VELOCITY: 3.8 CM2
ECHO AV AREA/BSA PEAK VELOCITY: 1.8 CM2/M2
ECHO AV PEAK GRADIENT: 7 MMHG
ECHO AV PEAK VELOCITY: 1.3 M/S
ECHO AV VELOCITY RATIO: 0.77
ECHO BSA: 2.13 M2
ECHO IVC EXP: 0.6 CM
ECHO LA AREA 2C: 22 CM2
ECHO LA AREA 4C: 17.8 CM2
ECHO LA MAJOR AXIS: 5.8 CM
ECHO LA MINOR AXIS: 5.9 CM
ECHO LA VOL BP: 54 ML (ref 22–52)
ECHO LA VOL MOD A2C: 66 ML (ref 22–52)
ECHO LA VOL MOD A4C: 44 ML (ref 22–52)
ECHO LA VOL/BSA BIPLANE: 26 ML/M2 (ref 16–34)
ECHO LA VOLUME INDEX MOD A2C: 32 ML/M2 (ref 16–34)
ECHO LA VOLUME INDEX MOD A4C: 21 ML/M2 (ref 16–34)
ECHO LV E' LATERAL VELOCITY: 5.06 CM/S
ECHO LV E' SEPTAL VELOCITY: 4.68 CM/S
ECHO LV EDV A2C: 124 ML
ECHO LV EDV A4C: 94 ML
ECHO LV EDV INDEX A4C: 45 ML/M2
ECHO LV EDV NDEX A2C: 59 ML/M2
ECHO LV EF PHYSICIAN: 58 %
ECHO LV EJECTION FRACTION A2C: 63 %
ECHO LV EJECTION FRACTION A4C: 58 %
ECHO LV EJECTION FRACTION BIPLANE: 60 % (ref 55–100)
ECHO LV ESV A2C: 46 ML
ECHO LV ESV A4C: 39 ML
ECHO LV ESV INDEX A2C: 22 ML/M2
ECHO LV ESV INDEX A4C: 19 ML/M2
ECHO LV FRACTIONAL SHORTENING: 30 % (ref 28–44)
ECHO LV INTERNAL DIMENSION DIASTOLE INDEX: 2.39 CM/M2
ECHO LV INTERNAL DIMENSION DIASTOLIC: 5 CM (ref 3.9–5.3)
ECHO LV INTERNAL DIMENSION SYSTOLIC INDEX: 1.67 CM/M2
ECHO LV INTERNAL DIMENSION SYSTOLIC: 3.5 CM
ECHO LV IVSD: 1 CM (ref 0.6–0.9)
ECHO LV MASS 2D: 169.9 G (ref 67–162)
ECHO LV MASS INDEX 2D: 81.3 G/M2 (ref 43–95)
ECHO LV POSTERIOR WALL DIASTOLIC: 0.9 CM (ref 0.6–0.9)
ECHO LV RELATIVE WALL THICKNESS RATIO: 0.36
ECHO LVOT AREA: 4.9 CM2
ECHO LVOT DIAM: 2.5 CM
ECHO LVOT MEAN GRADIENT: 2 MMHG
ECHO LVOT PEAK GRADIENT: 4 MMHG
ECHO LVOT PEAK VELOCITY: 1 M/S
ECHO LVOT STROKE VOLUME INDEX: 52.8 ML/M2
ECHO LVOT SV: 110.4 ML
ECHO LVOT VTI: 22.5 CM
ECHO MV A VELOCITY: 0.69 M/S
ECHO MV E DECELERATION TIME (DT): 192 MS
ECHO MV E VELOCITY: 0.55 M/S
ECHO MV E/A RATIO: 0.8
ECHO MV E/E' LATERAL: 10.87
ECHO MV E/E' RATIO (AVERAGED): 11.31
ECHO MV E/E' SEPTAL: 11.75
ECHO RA AREA 4C: 21 CM2
ECHO RA END SYSTOLIC VOLUME APICAL 4 CHAMBER INDEX BSA: 28 ML/M2
ECHO RA VOLUME: 58 ML
ECHO RV BASAL DIMENSION: 3.5 CM
ECHO RV FREE WALL PEAK S': 10.4 CM/S
ECHO RV MID DIMENSION: 2.5 CM
ECHO RV TAPSE: 1.9 CM (ref 1.7–?)
ECHO TV REGURGITANT MAX VELOCITY: 1.39 M/S
ECHO TV REGURGITANT PEAK GRADIENT: 8 MMHG
GFR SERPLBLD CREATININE-BSD FMLA CKD-EPI: 77 ML/MIN/{1.73_M2}
GLUCOSE SERPL-MCNC: 101 MG/DL (ref 70–99)
HCT VFR BLD AUTO: 42 % (ref 36–48)
HDLC SERPL-MCNC: 51 MG/DL (ref 40–60)
HGB BLD-MCNC: 14.1 G/DL (ref 12–16)
LDLC SERPL CALC-MCNC: 116 MG/DL
MCH RBC QN AUTO: 34.3 PG (ref 26–34)
MCHC RBC AUTO-ENTMCNC: 33.4 G/DL (ref 31–36)
MCV RBC AUTO: 102.5 FL (ref 80–100)
PLATELET # BLD AUTO: ABNORMAL K/UL (ref 135–450)
PLATELET BLD QL SMEAR: ABNORMAL
PMV BLD AUTO: ABNORMAL FL (ref 5–10.5)
POTASSIUM SERPL-SCNC: 3.9 MMOL/L (ref 3.5–5.1)
RBC # BLD AUTO: 4.1 M/UL (ref 4–5.2)
SODIUM SERPL-SCNC: 137 MMOL/L (ref 136–145)
TRIGL SERPL-MCNC: 125 MG/DL (ref 0–150)
TROPONIN, HIGH SENSITIVITY: 18 NG/L (ref 0–14)
TSH SERPL DL<=0.005 MIU/L-ACNC: 1.5 UIU/ML (ref 0.27–4.2)
VLDLC SERPL CALC-MCNC: 25 MG/DL
WBC # BLD AUTO: 7.6 K/UL (ref 4–11)

## 2025-04-16 PROCEDURE — 84443 ASSAY THYROID STIM HORMONE: CPT

## 2025-04-16 PROCEDURE — 6370000000 HC RX 637 (ALT 250 FOR IP): Performed by: INTERNAL MEDICINE

## 2025-04-16 PROCEDURE — 99254 IP/OBS CNSLTJ NEW/EST MOD 60: CPT | Performed by: INTERNAL MEDICINE

## 2025-04-16 PROCEDURE — 93306 TTE W/DOPPLER COMPLETE: CPT | Performed by: INTERNAL MEDICINE

## 2025-04-16 PROCEDURE — 36415 COLL VENOUS BLD VENIPUNCTURE: CPT

## 2025-04-16 PROCEDURE — 80048 BASIC METABOLIC PNL TOTAL CA: CPT

## 2025-04-16 PROCEDURE — 2060000000 HC ICU INTERMEDIATE R&B

## 2025-04-16 PROCEDURE — 6360000002 HC RX W HCPCS: Performed by: INTERNAL MEDICINE

## 2025-04-16 PROCEDURE — 85027 COMPLETE CBC AUTOMATED: CPT

## 2025-04-16 PROCEDURE — 2500000003 HC RX 250 WO HCPCS: Performed by: INTERNAL MEDICINE

## 2025-04-16 PROCEDURE — 84484 ASSAY OF TROPONIN QUANT: CPT

## 2025-04-16 PROCEDURE — 6360000004 HC RX CONTRAST MEDICATION: Performed by: INTERNAL MEDICINE

## 2025-04-16 PROCEDURE — C8929 TTE W OR WO FOL WCON,DOPPLER: HCPCS

## 2025-04-16 PROCEDURE — 99223 1ST HOSP IP/OBS HIGH 75: CPT | Performed by: NURSE PRACTITIONER

## 2025-04-16 PROCEDURE — 80061 LIPID PANEL: CPT

## 2025-04-16 RX ORDER — DILTIAZEM HYDROCHLORIDE 180 MG/1
180 CAPSULE, COATED, EXTENDED RELEASE ORAL DAILY
Status: DISCONTINUED | OUTPATIENT
Start: 2025-04-17 | End: 2025-04-17 | Stop reason: HOSPADM

## 2025-04-16 RX ORDER — VALSARTAN 80 MG/1
160 TABLET ORAL DAILY
Status: DISCONTINUED | OUTPATIENT
Start: 2025-04-16 | End: 2025-04-17 | Stop reason: HOSPADM

## 2025-04-16 RX ORDER — HYDRALAZINE HYDROCHLORIDE 20 MG/ML
10 INJECTION INTRAMUSCULAR; INTRAVENOUS
Status: DISCONTINUED | OUTPATIENT
Start: 2025-04-16 | End: 2025-04-17 | Stop reason: HOSPADM

## 2025-04-16 RX ADMIN — ENOXAPARIN SODIUM 40 MG: 100 INJECTION SUBCUTANEOUS at 10:00

## 2025-04-16 RX ADMIN — SODIUM CHLORIDE, PRESERVATIVE FREE 5 ML: 5 INJECTION INTRAVENOUS at 08:26

## 2025-04-16 RX ADMIN — SULFUR HEXAFLUORIDE 2 ML: 60.7; .19; .19 INJECTION, POWDER, LYOPHILIZED, FOR SUSPENSION INTRAVENOUS; INTRAVESICAL at 10:05

## 2025-04-16 RX ADMIN — AMLODIPINE BESYLATE 10 MG: 10 TABLET ORAL at 09:57

## 2025-04-16 RX ADMIN — HYDROCHLOROTHIAZIDE 12.5 MG: 25 TABLET ORAL at 09:58

## 2025-04-16 RX ADMIN — ASPIRIN 81 MG: 81 TABLET, CHEWABLE ORAL at 10:00

## 2025-04-16 RX ADMIN — VALSARTAN 160 MG: 80 TABLET, FILM COATED ORAL at 14:39

## 2025-04-16 NOTE — DISCHARGE INSTRUCTIONS
Catheter Ablation for SVT: Before Your Procedure (02:14)  Your health professional recommends that you watch this short online health video.  Learn about catheter ablation for supraventricular tachycardia and how this procedure is done.   Purpose: Prepare for catheter ablation treatment for supraventricular tachycardia (SVT).  Goal: Prepare for catheter ablation treatment for supraventricular tachycardia (SVT).    Watch: Scan the QR code or visit the link to view video       [email or web address]  Current as of: July 31, 2024  Content Version: 14.4  © 2006-2025 [employer].   Care instructions adapted under license by [employer]. If you have questions about a medical condition or this instruction, always ask your healthcare professional. [employer], disclaims any warranty or liability for your use of this information.       Learning About Catheter Ablation for Heart Rhythm Problems  What is catheter ablation?     Catheter ablation is a procedure that treats heart rhythm problems. These problems include atrial fibrillation, supraventricular tachycardia (SVT), atrial flutter, and ventricular tachycardia.  Your heart should have a strong, steady beat. That beat is controlled by the heart's electrical system. Sometimes that system misfires. This causes a heartbeat that is too fast and isn't steady.  Catheter ablation is a way to get into your heart and fix the problem. Ablation is not surgery.  How is catheter ablation done?  Your doctor inserts thin tubes called catheters into a blood vessel in your groin, arm, or neck. Then your doctor feeds them into the heart. Wires in the catheters help the doctor find the problem areas. Then the doctor uses the wires to send energy to destroy the tiny areas of heart tissue that are causing the problems.  It may seem like a bad idea to destroy parts of your heart on purpose. But the areas that are destroyed are very tiny. They should not  affect your heart's ability to do its job.  You may be awake during the procedure. Or you may be asleep. The doctor will give you medicines to help you feel relaxed and to numb the areas where the catheters go in.  What can you expect after catheter ablation?  You may stay overnight in the hospital.  You can do light activities at home. Don't do anything strenuous until your doctor says it is okay. This may be for several days.  You may have swelling, bruising, or a small lump around the site where the catheters went into your body. These should go away in 3 to 4 weeks.  You may have to take some medicines for a while.  Follow-up care is a key part of your treatment and safety. Be sure to make and go to all appointments, and call your doctor if you are having problems. It's also a good idea to know your test results and keep a list of the medicines you take.  Where can you learn more?  Go to https://www.[employer].net/patientEd and enter N533 to learn more about \"Learning About Catheter Ablation for Heart Rhythm Problems.\"  Current as of: July 31, 2024  Content Version: 14.4  © 0245-8481 [employer].   Care instructions adapted under license by [employer] [facility]. If you have questions about a medical condition or this instruction, always ask your healthcare professional. [employer], disclaims any warranty or liability for your use of this information.      Please follow up with your primary care physician within 1 week following discharge. Please follow up with the Electrophysiologist in the next 2 weeks. Please continue taking all of the blood pressure medications as prescribed. Please stop taking the amlodipine as we have changed this to a similar medication called diltiazem.     Please return to the hospital with any new or worsening symptoms.     Potential assistance with Medications: Call for information     [employer]  [email or web address]    [facility]  Phone:

## 2025-04-16 NOTE — CARE COORDINATION
9:23am: Case Management Assessment  Initial Evaluation    Date/Time of Evaluation: 4/16/2025 9:23 AM  Assessment Completed by: CAROL Matos   for [facility] [place] ([facility])  IP Street Mobile: [phone]    If patient is discharged prior to next notation, then this note serves as note for discharge by case management.    Patient Name: Simi Iglesias                   YOB: 1975  Diagnosis: SVT (supraventricular tachycardia) [I47.10]  Chest pain [R07.9]  Troponin level elevated [R79.89]                   Date / Time: 4/15/2025  1:11 PM    Patient Admission Status: Observation   Readmission Risk (Low < 19, Mod (19-27), High > 27): No data recorded  Current PCP: No primary care provider on file.  PCP verified by CM? Yes    Chart Reviewed: Yes      History Provided by: Patient  Patient Orientation: Alert and Oriented    Patient Cognition: Alert    Hospitalization in the last 30 days (Readmission):  No    If yes, Readmission Assessment in CM Navigator will be completed.    Advance Directives:      Code Status: Full Code   Patient's Primary Decision Maker is: Patient Declined (Legal Next of Kin Remains as Decision Maker)      Discharge Planning:    Patient lives with: Spouse/Significant Other, Children, Family Members Type of Home: Apartment  Primary Care Giver: Self  Patient Support Systems include: Spouse/Significant Other, Family Members   Current Financial resources: None  Current community resources: None  Current services prior to admission: None            Current DME:              Type of Home Care services:  None    ADLS  Prior functional level: Independent in ADLs/IADLs  Current functional level: Independent in ADLs/IADLs    PT AM-PAC:   /24  OT AM-PAC:   /24    Family can provide assistance at DC: Yes  Would you like Case Management to discuss the discharge plan with any other family members/significant others, and if so, who? Yes  Plans to

## 2025-04-16 NOTE — PROGRESS NOTES
[facility] Medicine Progress Note  V 1.6      Date of Admission: 4/15/2025    Hospital Day: 2      Chief Admission Complaint:  Chest pressure and palpitations     Subjective:  Patient seen and examined at bedside. No acute events overnight. Patient reports feeling well at this time. No further episodes of palpitations/chest pressure. Denies any other acute concerns.     Presenting Admission History:       CKD stage III, tobacco use disorder who presented to ED with a complaint of chest pressure and palpitations. Patient started having an episode of palpitation associated with chest pressure was started out probably earlier today. Patient denies any dizziness or loss of consciousness. Patient denies any shortness of breath nausea or vomiting. Patient denies any recent similar episodes. She did report that she had a similar episode about 5 years ago. Patient denies any excessive use of caffeinated drinks. In ED patient was found to be in supraventricular tachycardia. Patient received IV adenosine 6 mg with conversion of the SVT to normal sinus rhythm. Patient currently feels better and denies any current chest pain or difficulty breathing. In ED labs were remarkable for progressively elevated troponin at 10, 26, 40. Patient is currently assigned to observation status for close monitoring and further evaluation     Assessment/Plan:      Current Principal Problem:  Chest pain    Chest pressure  SVT  Elevated troponin, likely secondary to type II MI related to SVT  - Patient presented with reports of palpitations associated with chest pressure. No other associated symptoms such as nausea/vomiting, dizziness, SOB, LOC. Had similar episode about 5 years ago  - EKG on arrival showed SVT. Received 6mg IV adenosine which did convert her out of the rhythm  - Troponin initially trended upwards, peaked at 40, has since been downtrending  - TSH normal at 1.50. K normal at 3.9.   - Echo ordered, EF 55-60%, normal wall motion,  G1DD. Cardiology consulted, appreciate their recommendations.   - Stress test tomorrow. Resume diet, no caffeine. NPO at midnight. Likely discharge after stress if unremarkable. Will need outpatient EP follow up.     HTN  - Resume home amlodipine, hydrochlorothiazide. PRNs in place. Adding valsartan today given significant hypertension. Will transition to diltiazem from amlodipine to start tomorrow. Continue to monitor closely.     Consults:      IP CONSULT TO CARDIOLOGY        --------------------------------------------------      Medications:        Infusion Medications    sodium chloride       Scheduled Medications    amLODIPine  10 mg Oral Daily    hydroCHLOROthiazide  12.5 mg Oral Daily    sodium chloride flush  5-40 mL IntraVENous 2 times per day    aspirin  81 mg Oral Daily    enoxaparin  40 mg SubCUTAneous Daily     PRN Meds: hydrALAZINE, sodium chloride flush, sodium chloride, potassium chloride **OR** potassium alternative oral replacement **OR** potassium chloride, magnesium sulfate, ondansetron **OR** ondansetron, acetaminophen **OR** acetaminophen, polyethylene glycol, sulfur hexafluoride microspheres      Physical Exam Performed:      General appearance:  No apparent distress  Respiratory:  Normal respiratory effort.   Cardiovascular:  Regular rate and rhythm.  Abdomen:  Soft, non-tender, non-distended.  Musculoskeletal:  No edema  Neurologic:  Non-focal  Psychiatric:  Alert and oriented    BP (!) 166/107   Pulse 63   Temp 97.7 °F (36.5 °C) (Oral)   Resp 16   Ht 1.753 m (5' 9\")   Wt 93.7 kg (206 lb 9.6 oz)   SpO2 99%   BMI 30.51 kg/m²     Telemetry:      Personally reviewed and interpreted telemetry (Rhythm Strip) on 4/16/2025 with the following findings: sinus rhythm    Diet: Diet NPO    DVT Prophylaxis: [x]PPx LMWH  []SQ Heparin  []IPC/SCDs  []Eliquis  []Xarelto  []Coumadin  [] Heparin Drip  []Other -      Code status: Full Code    PT/OT Eval Status:   [x]NOT yet ordered  []Ordered and

## 2025-04-16 NOTE — PROGRESS NOTES
Contacting covering HCP for pt's high B/P; orders for hydralazine with SBP + DBP parameters given

## 2025-04-16 NOTE — CONSULTS
CARDIOLOGY CONSULTATION        Patient Name: Siim Iglesias  Date of admission: 4/15/2025  1:11 PM  Admission Dx: SVT (supraventricular tachycardia) [I47.10]  Chest pain [R07.9]  Troponin level elevated [R79.89]  Requesting Physician: John Ryan MD  Primary Care physician: No primary care provider on file.    Reason for Consultation/Chief Complaint: SVT    History of Present Illness:     Simi Iglesias is a 50 y.o. patient who presented to the hospital with complaints of palpitations and tachycardia. Pt was at work and felt her heart racing. Reported in the ER that this has happened in the past, converted at that time without adenosine. She also has HTN, unclear if she has been compliant with medications.     ED course/Vital signs on presentation:  on arrival, /109. Troponin 10 ->40 ->18.     Patient resting in bed,  at bedside. She was cooking dinner when she began to feel the palpitations. She reports that she has felt that 3 or 4 times before. She has not been on her medications recently. She reports a family history of high blood pressure, no heart disease that she is aware of.       Past Medical History:   has a past medical history of Hypertension.    Surgical History:   has no past surgical history on file.     Social History:   reports that she has been smoking. She has a 12 pack-year smoking history. She has never used smokeless tobacco. She reports that she does not drink alcohol and does not use drugs.     Family History:  family history is not on file.      Home Medications:  Were reviewed and are listed in nursing record and/or below  Prior to Admission medications    Medication Sig Start Date End Date Taking? Authorizing Provider   amLODIPine (NORVASC) 10 MG tablet Take 1 tablet by mouth daily 2/12/20  Yes Blas Angulo PA-C   hydrochlorothiazide (HYDRODIURIL) 25 MG tablet Take 0.5 tablets by mouth daily 2/12/20  Yes Blas Angulo PA-C        CURRENT  normoactive bowel sounds. No masses, no hepatosplenomegaly   Extremities: No cyanosis, clubbing or pitting edema.    Vascular: 2+ radial, brachial, femoral, dorsalis pedis and posterior tibial pulses bilaterally. no carotid bruit.    Skin: Skin color, texture, turgor are normal with no rashes or ulceration.    Pysch:  Appropriate affect   Neurologic: Oriented to person, place and time. No slurred speech or facial asymmetry. No motor or sensory deficits on gross examination.         Labs:   CBC:   Lab Results   Component Value Date/Time    WBC 7.6 04/16/2025 04:38 AM    RBC 4.10 04/16/2025 04:38 AM    HGB 14.1 04/16/2025 04:38 AM    HCT 42.0 04/16/2025 04:38 AM    .5 04/16/2025 04:38 AM    RDW 14.1 04/16/2025 04:38 AM    PLT see below 04/16/2025 04:38 AM     CMP:  Lab Results   Component Value Date/Time     04/16/2025 04:38 AM    K 3.9 04/16/2025 04:38 AM     04/16/2025 04:38 AM    CO2 19 04/16/2025 04:38 AM    BUN 14 04/16/2025 04:38 AM    CREATININE 0.9 04/16/2025 04:38 AM    GFRAA >60 02/12/2020 09:57 AM    AGRATIO 1.2 04/15/2025 02:28 PM    LABGLOM 77 04/16/2025 04:38 AM    GLUCOSE 101 04/16/2025 04:38 AM    CALCIUM 8.7 04/16/2025 04:38 AM    BILITOT <0.2 04/15/2025 02:28 PM    ALKPHOS 92 04/15/2025 02:28 PM    AST 17 04/15/2025 02:28 PM    ALT 10 04/15/2025 02:28 PM     PT/INR:  No results found for: \"PTINR\"  HgBA1c:No results found for: \"LABA1C\"  Lab Results   Component Value Date    TROPONINI <0.01 02/12/2020       No results found for: \"CHOL\"  No results found for: \"TRIG\"  No results found for: \"HDL\"  No components found for: \"LDLCHOLESTEROL\", \"LDLCALC\"  No results found for: \"VLDL\"  No results found for: \"CHOLHDLRATIO\"     Cardiac Data:     15-APR-2025 13:15:43 [facility] ER ROUTINE RECORD  Poor data quality, interpretation may be adversely affected  Supraventricular tachycardia  Nonspecific ST abnormality  Abnormal ECG    15-APR-2025 14:45:53 [facility] ER ROUTINE

## 2025-04-16 NOTE — CONSULTS
[facility]   Electrophysiology Nurse Practitioner  Consult Note    Date: 4/16/2025    Reason for Consultation/ Chief Complaint: SVT    History Obtained From: Patient and medical record.     Cardiac Hx: Simi Iglesias is a 50 y.o. female with a PMHx significant for essential HTN, CKD stage III, tobacco use disorder and SVT (2020, UC) - converted after IVF, no adenosine given. No f/u with EP/Cardiology.     HPI:  Patient presented to the hospital with complaints of palpitations associated with chest pressure. Pt had just arrived at work and felt her heart start racing, associated with chest pressure and shortness of breath. She reports having these episodes roughly 1-2 times a year and typically do not last long. Symptoms were similar to 2020 when she presented in SVT. She does admit to being dehydrated d/t not drinking water and drinking 1.5L bottle of wine daily. She denies excessive caffeine use. In the ED patient was found to be in SVT. Valsalva maneuver unsuccessful. She received IV adenosine 6mg with conversion to NSR. BP has been elevated, 150s/109. Troponin peak at 40. She is feeling better this morning with no further chest discomfort or palpitations. Denies shortness of breath, LH/dizziness, orthopnea, PND or LE edema.     Home medications:   No current facility-administered medications on file prior to encounter.     Current Outpatient Medications on File Prior to Encounter   Medication Sig Dispense Refill    amLODIPine (NORVASC) 10 MG tablet Take 1 tablet by mouth daily 30 tablet 5    hydrochlorothiazide (HYDRODIURIL) 25 MG tablet Take 0.5 tablets by mouth daily 30 tablet 5       Scheduled Meds:   amLODIPine  10 mg Oral Daily    hydroCHLOROthiazide  12.5 mg Oral Daily    sodium chloride flush  5-40 mL IntraVENous 2 times per day    aspirin  81 mg Oral Daily    enoxaparin  40 mg SubCUTAneous Daily     Continuous Infusions:   sodium chloride       PRN Meds:hydrALAZINE, sodium chloride flush,  Follow up with Dr. Bonds in 1-2 weeks to discuss long term management  - ECG ordered and results personally reviewed     2. HTN  - elevated - hx of noncompliance?  - On Amlodipine/HCTZ - continue       All questions and concerns were addressed to the patient/family. Alternatives to my treatment were discussed. The note was completed using EMR. Every effort was made to ensure accuracy; however, inadvertent computerized transcription errors may be present.     Joya Winston [facility]

## 2025-04-16 NOTE — PLAN OF CARE
Problem: Pain  Goal: Verbalizes/displays adequate comfort level or baseline comfort level  Outcome: Progressing  Flowsheets (Taken 4/15/2025 2100 by Wilfredo Stephens RN)  Verbalizes/displays adequate comfort level or baseline comfort level:   Encourage patient to monitor pain and request assistance   Assess pain using appropriate pain scale   Administer analgesics based on type and severity of pain and evaluate response   Implement non-pharmacological measures as appropriate and evaluate response   Consider cultural and social influences on pain and pain management     Problem: Safety - Adult  Goal: Free from fall injury  Outcome: Progressing  Flowsheets (Taken 4/16/2025 1834)  Free From Fall Injury: Instruct family/caregiver on patient safety

## 2025-04-16 NOTE — H&P
Hospital Medicine History & Physical      Date of Admission: 4/15/2025    Date of Service:  Pt seen/examined on 04/15/25     []Admitted to Inpatient with expected LOS greater than two midnights due to medical therapy.  [x]Placed in Observation status.    Chief Admission Complaint: Chest pressure and palpitations    Presenting Admission History:      50 y.o. female with PMHx significant for essential hypertension, CKD stage III, tobacco use disorder who presented to ED with a complaint of chest pressure and palpitations.  Patient started having an episode of palpitation associated with chest pressure was started out probably earlier today.  Patient denies any dizziness or loss of consciousness.  Patient denies any shortness of breath nausea or vomiting.  Patient denies any recent similar episodes.  She did report that she had a similar episode about 5 years ago.  Patient denies any excessive use of caffeinated drinks.  In ED patient was found to be in supraventricular tachycardia.  Patient received IV adenosine 6 mg with conversion of the SVT to normal sinus rhythm.  Patient currently feels better and denies any current chest pain or difficulty breathing.  In ED labs were remarkable for progressively elevated troponin at 10, 26, 40.  Patient is currently assigned to observation status for close monitoring and further evaluation    ROS:     Review of 10 systems is negative except what is outlined in HPI.     Assessment:  Supraventricular tachycardia.  Elevated troponin, likely secondary to type II MI related to above.  Chest pain and palpitations, improved.  Essential hypertension.  CKD stage II-III.  Tobacco use disorder    Plan:    Patient is currently assigned to observation status.  Add low-dose of p.o. Lopressor 25 mg twice daily.  Obtain echocardiogram.  Check TSH level.  Patient might benefit from radiofrequency ablation of SVT recurred in the future  Cardiology consultation.  Defer any potential need for

## 2025-04-17 ENCOUNTER — HOSPITAL ENCOUNTER (INPATIENT)
Age: 50
Discharge: HOME | DRG: 310 | End: 2025-04-19
Attending: INTERNAL MEDICINE

## 2025-04-17 ENCOUNTER — APPOINTMENT (OUTPATIENT)
Dept: NUCLEAR MEDICINE | Age: 50
DRG: 310 | End: 2025-04-17
Attending: INTERNAL MEDICINE

## 2025-04-17 VITALS
RESPIRATION RATE: 13 BRPM | DIASTOLIC BLOOD PRESSURE: 97 MMHG | HEART RATE: 68 BPM | HEIGHT: 69 IN | BODY MASS INDEX: 29.77 KG/M2 | WEIGHT: 201 LBS | OXYGEN SATURATION: 97 % | SYSTOLIC BLOOD PRESSURE: 132 MMHG | TEMPERATURE: 98.1 F

## 2025-04-17 LAB
ANION GAP SERPL CALCULATED.3IONS-SCNC: 8 MMOL/L (ref 3–16)
BASOPHILS # BLD: 0 K/UL (ref 0–0.2)
BASOPHILS NFR BLD: 0.3 %
BUN SERPL-MCNC: 13 MG/DL (ref 7–20)
CALCIUM SERPL-MCNC: 9.2 MG/DL (ref 8.3–10.6)
CHLORIDE SERPL-SCNC: 104 MMOL/L (ref 99–110)
CO2 SERPL-SCNC: 26 MMOL/L (ref 21–32)
CREAT SERPL-MCNC: 0.9 MG/DL (ref 0.6–1.1)
DEPRECATED RDW RBC AUTO: 13.8 % (ref 12.4–15.4)
ECHO BSA: 2.13 M2
EOSINOPHIL # BLD: 0.1 K/UL (ref 0–0.6)
EOSINOPHIL NFR BLD: 1.8 %
GFR SERPLBLD CREATININE-BSD FMLA CKD-EPI: 77 ML/MIN/{1.73_M2}
GLUCOSE SERPL-MCNC: 112 MG/DL (ref 70–99)
HCT VFR BLD AUTO: 42.9 % (ref 36–48)
HGB BLD-MCNC: 14.7 G/DL (ref 12–16)
LYMPHOCYTES # BLD: 2.4 K/UL (ref 1–5.1)
LYMPHOCYTES NFR BLD: 40.4 %
MCH RBC QN AUTO: 34.3 PG (ref 26–34)
MCHC RBC AUTO-ENTMCNC: 34.3 G/DL (ref 31–36)
MCV RBC AUTO: 100 FL (ref 80–100)
MONOCYTES # BLD: 0.4 K/UL (ref 0–1.3)
MONOCYTES NFR BLD: 6.3 %
NEUTROPHILS # BLD: 3.1 K/UL (ref 1.7–7.7)
NEUTROPHILS NFR BLD: 51.2 %
NUC STRESS EJECTION FRACTION: 62 %
NUC STRESS LV EDV: 66 ML (ref 56–104)
NUC STRESS LV ESV: 25 ML (ref 19–49)
NUC STRESS LV MASS: 117 G
NUC STRESS LV STROKE VOLUME: 41 ML
PLATELET # BLD AUTO: 198 K/UL (ref 135–450)
PMV BLD AUTO: 9.3 FL (ref 5–10.5)
POTASSIUM SERPL-SCNC: 4.1 MMOL/L (ref 3.5–5.1)
RBC # BLD AUTO: 4.29 M/UL (ref 4–5.2)
SODIUM SERPL-SCNC: 138 MMOL/L (ref 136–145)
STRESS BASELINE DIAS BP: 111 MMHG
STRESS BASELINE HR: 60 BPM
STRESS BASELINE SYS BP: 148 MMHG
STRESS ESTIMATED WORKLOAD: 1 METS
STRESS PEAK DIAS BP: 94 MMHG
STRESS PEAK SYS BP: 153 MMHG
STRESS PERCENT HR ACHIEVED: 58 %
STRESS POST PEAK HR: 97 BPM
STRESS RATE PRESSURE PRODUCT: NORMAL BPM*MMHG
STRESS ST DEPRESSION: 0 MM
STRESS TARGET HR: 168 BPM
TID: 1.26
WBC # BLD AUTO: 6 K/UL (ref 4–11)

## 2025-04-17 PROCEDURE — 93016 CV STRESS TEST SUPVJ ONLY: CPT | Performed by: INTERNAL MEDICINE

## 2025-04-17 PROCEDURE — 93017 CV STRESS TEST TRACING ONLY: CPT

## 2025-04-17 PROCEDURE — A9502 TC99M TETROFOSMIN: HCPCS | Performed by: INTERNAL MEDICINE

## 2025-04-17 PROCEDURE — 99233 SBSQ HOSP IP/OBS HIGH 50: CPT | Performed by: INTERNAL MEDICINE

## 2025-04-17 PROCEDURE — 2500000003 HC RX 250 WO HCPCS: Performed by: INTERNAL MEDICINE

## 2025-04-17 PROCEDURE — 85025 COMPLETE CBC W/AUTO DIFF WBC: CPT

## 2025-04-17 PROCEDURE — 80048 BASIC METABOLIC PNL TOTAL CA: CPT

## 2025-04-17 PROCEDURE — 36415 COLL VENOUS BLD VENIPUNCTURE: CPT

## 2025-04-17 PROCEDURE — 93018 CV STRESS TEST I&R ONLY: CPT | Performed by: INTERNAL MEDICINE

## 2025-04-17 PROCEDURE — 6370000000 HC RX 637 (ALT 250 FOR IP): Performed by: INTERNAL MEDICINE

## 2025-04-17 PROCEDURE — 3430000000 HC RX DIAGNOSTIC RADIOPHARMACEUTICAL: Performed by: INTERNAL MEDICINE

## 2025-04-17 PROCEDURE — 78452 HT MUSCLE IMAGE SPECT MULT: CPT | Performed by: INTERNAL MEDICINE

## 2025-04-17 PROCEDURE — 78452 HT MUSCLE IMAGE SPECT MULT: CPT

## 2025-04-17 PROCEDURE — 6360000002 HC RX W HCPCS: Performed by: INTERNAL MEDICINE

## 2025-04-17 RX ORDER — HYDROCHLOROTHIAZIDE 25 MG/1
12.5 TABLET ORAL DAILY
Qty: 30 TABLET | Refills: 5 | Status: SHIPPED | OUTPATIENT
Start: 2025-04-17

## 2025-04-17 RX ORDER — REGADENOSON 0.08 MG/ML
0.4 INJECTION, SOLUTION INTRAVENOUS
Status: COMPLETED | OUTPATIENT
Start: 2025-04-17 | End: 2025-04-17

## 2025-04-17 RX ORDER — ASPIRIN 81 MG/1
81 TABLET, CHEWABLE ORAL DAILY
Qty: 30 TABLET | Refills: 3 | Status: CANCELLED | OUTPATIENT
Start: 2025-04-18

## 2025-04-17 RX ORDER — VALSARTAN 160 MG/1
160 TABLET ORAL DAILY
Qty: 30 TABLET | Refills: 3 | Status: SHIPPED | OUTPATIENT
Start: 2025-04-18 | End: 2025-04-22

## 2025-04-17 RX ORDER — DILTIAZEM HYDROCHLORIDE 180 MG/1
180 CAPSULE, COATED, EXTENDED RELEASE ORAL DAILY
Qty: 30 CAPSULE | Refills: 3 | Status: SHIPPED | OUTPATIENT
Start: 2025-04-17

## 2025-04-17 RX ADMIN — VALSARTAN 160 MG: 80 TABLET, FILM COATED ORAL at 07:48

## 2025-04-17 RX ADMIN — DILTIAZEM HYDROCHLORIDE 180 MG: 180 CAPSULE, EXTENDED RELEASE ORAL at 09:51

## 2025-04-17 RX ADMIN — SODIUM CHLORIDE, PRESERVATIVE FREE 10 ML: 5 INJECTION INTRAVENOUS at 07:50

## 2025-04-17 RX ADMIN — TETROFOSMIN 33.5 MILLICURIE: 1.38 INJECTION, POWDER, LYOPHILIZED, FOR SOLUTION INTRAVENOUS at 08:30

## 2025-04-17 RX ADMIN — ASPIRIN 81 MG: 81 TABLET, CHEWABLE ORAL at 07:48

## 2025-04-17 RX ADMIN — REGADENOSON 0.4 MG: 0.08 INJECTION, SOLUTION INTRAVENOUS at 08:30

## 2025-04-17 RX ADMIN — TETROFOSMIN 11.84 MILLICURIE: 1.38 INJECTION, POWDER, LYOPHILIZED, FOR SOLUTION INTRAVENOUS at 07:51

## 2025-04-17 RX ADMIN — HYDROCHLOROTHIAZIDE 12.5 MG: 25 TABLET ORAL at 07:48

## 2025-04-17 NOTE — CARE COORDINATION
Addendum at 3:07pm:          Case Management Assessment            Discharge Note                    Date / Time of Note: 4/17/2025 3:07 PM                  Discharge Note Completed by: Ashley VARGAS, CAROL   for [facility] ([facility])  [employer]: [phone]    Patient Name: Simi Iglesias   YOB: 1975  Diagnosis: SVT (supraventricular tachycardia) [I47.10]  Chest pain [R07.9]  Troponin level elevated [R79.89]   Date / Time: 4/15/2025  1:11 PM    Current PCP: No primary care provider on file.  Clinic patient: No    Hospitalization in the last 30 days: No     Advance Directives:  Code Status: Full Code  [place] DNR form completed and on chart: Not Indicated    Financial:  Payor: /      Pharmacy:    [id number] [place] [address] [address] - P [phone] - F [phone]  [facility] [address]  Phone: [phone] Fax: [phone]      Assistance purchasing medications?: Potential Assistance Purchasing Medications: Yes  Assistance provided by Case Management: Voucher with  Director Approval    Does patient want to participate in local refill/ meds to beds program?: Yes    Meds To Beds General Rules:  1. Can ONLY be done Monday- Friday between 8:30am-5pm  2. Prescription(s) must be in pharmacy by 3pm to be filled same day  3.Copy of patient's insurance/ prescription drug card and patient face sheet must be sent along with the prescription(s)  4. Cost of Rx cannot be added to hospital bill. If financial assistance is needed, please contact unit  or ;  or  CANNOT provide pharmacy voucher for patients co-pays  5. Patients can then  the prescription on their way out of the hospital at discharge, or pharmacy can deliver to the bedside if staff is available. (payment due at time of pick-up or delivery - cash, check, or card accepted)     Able to afford

## 2025-04-17 NOTE — PLAN OF CARE
Problem: Pain  Goal: Verbalizes/displays adequate comfort level or baseline comfort level  4/17/2025 1027 by Nikole Barragan RN  Outcome: Progressing  4/17/2025 0448 by Elliott Guadarrama RN  Outcome: Progressing  Flowsheets (Taken 4/16/2025 1938 by Sharee Toribio, RN)  Verbalizes/displays adequate comfort level or baseline comfort level:   Encourage patient to monitor pain and request assistance   Assess pain using appropriate pain scale     Problem: Safety - Adult  Goal: Free from fall injury  4/17/2025 1027 by Nikole Barragan RN  Outcome: Progressing  4/17/2025 0448 by Elliott Guadarrama RN  Outcome: Progressing     Problem: Cardiovascular - Adult  Goal: Maintains optimal cardiac output and hemodynamic stability  4/17/2025 1027 by Nikole Barragan RN  Outcome: Progressing  4/17/2025 0448 by Elliott Guadarrama RN  Outcome: Progressing     Pt. Admitted 4/16/25 for SVT and chest pain. Patient is progressing towards care plan goals. Care continues.

## 2025-04-17 NOTE — PROGRESS NOTES
CARDIOLOGY PROGRESS NOTE        Patient Name: Simi Iglesias  Date of admission: 4/15/2025  1:11 PM  Admission Dx: SVT (supraventricular tachycardia) [I47.10]  Chest pain [R07.9]  Troponin level elevated [R79.89]  Requesting Physician: John Ryan MD  Primary Care physician: No primary care provider on file.    Reason for Consultation/Chief Complaint: SVT    History of Present Illness:     Simi Iglesias is a 50 y.o. patient who presented to the hospital with complaints of palpitations and tachycardia. Pt was at work and felt her heart racing. Reported in the ER that this has happened in the past, converted at that time without adenosine. She also has HTN, unclear if she has been compliant with medications.     ED course/Vital signs on presentation:  on arrival, /109. Troponin 10 ->40 ->18.     Patient is feeling good and ready to go home. She has no chest pain today.       Past Medical History:   has a past medical history of Hypertension.    Surgical History:   has no past surgical history on file.     Social History:   reports that she has been smoking. She has a 12 pack-year smoking history. She has never used smokeless tobacco. She reports that she does not drink alcohol and does not use drugs.     Family History:  family history is not on file.      Home Medications:  Were reviewed and are listed in nursing record and/or below  Prior to Admission medications    Medication Sig Start Date End Date Taking? Authorizing Provider   amLODIPine (NORVASC) 10 MG tablet Take 1 tablet by mouth daily 2/12/20  Yes Blas Angulo PA-C   hydrochlorothiazide (HYDRODIURIL) 25 MG tablet Take 0.5 tablets by mouth daily 2/12/20  Yes Blas Angulo PA-C        CURRENT Medications:  hydrALAZINE (APRESOLINE) injection 10 mg, Once PRN  valsartan (DIOVAN) tablet 160 mg, Daily  dilTIAZem (CARDIZEM CD) extended release capsule 180 mg, Daily  hydroCHLOROthiazide (HYDRODIURIL) tablet 12.5 mg, Daily  sodium

## 2025-04-17 NOTE — PROGRESS NOTES
Patient discharged to home via [employer]. Patient discharged with all belongings. Peripheral IV removed before discharge. AVS printed and reviewed with patients. All questions and concerned addressed. Meds to bed. Picked up by RN at [facility] outpatient pharmacy. Patient to follow up with outpatient clinic. Patient discharged via wheelchair.

## 2025-04-17 NOTE — PLAN OF CARE
Problem: Pain  Goal: Verbalizes/displays adequate comfort level or baseline comfort level  4/17/2025 1543 by Nikole Barragan RN  Outcome: Adequate for Discharge  4/17/2025 1027 by Nikole Barragan RN  Outcome: Progressing  4/17/2025 0448 by Elliott Guadarrama RN  Outcome: Progressing  Flowsheets (Taken 4/16/2025 1938 by Sharee Toribio, RN)  Verbalizes/displays adequate comfort level or baseline comfort level:   Encourage patient to monitor pain and request assistance   Assess pain using appropriate pain scale     Problem: Safety - Adult  Goal: Free from fall injury  4/17/2025 1543 by Nikole Barragan RN  Outcome: Adequate for Discharge  4/17/2025 1027 by Nikole Barragan RN  Outcome: Progressing  4/17/2025 0448 by Elliott Guadarrama RN  Outcome: Progressing     Problem: Cardiovascular - Adult  Goal: Maintains optimal cardiac output and hemodynamic stability  4/17/2025 1543 by Nikole Barragan RN  Outcome: Adequate for Discharge  4/17/2025 1027 by Nikole Barragan RN  Outcome: Progressing  4/17/2025 0448 by Elliott Guadarrama RN  Outcome: Progressing

## 2025-04-17 NOTE — PLAN OF CARE
Problem: Safety - Adult  Goal: Free from fall injury  4/17/2025 0448 by Elliott Guadarrama, RN  Outcome: Progressing  - Screening for Orthostasis AND not a Boyd Risk per OWUSU/ABCDS: Pt bed is in low position, side rails up, call light and belongings are in reach.  Fall risk light is on outside pts room.  Pt encouraged to call for assistance as needed. Will continue with hourly rounds for PO intake, pain needs, toileting and repositioning as needed.       Problem: Cardiovascular - Adult  Goal: Maintains optimal cardiac output and hemodynamic stability  Outcome: Progressing     Problem: Pain  Goal: Verbalizes/displays adequate comfort level or baseline comfort level  4/17/2025 0448 by Elliott Guadarrama, RN  Outcome: Progressing  Flowsheets (Taken 4/16/2025 1938 by Sharee Toribio, RN)  Verbalizes/displays adequate comfort level or baseline comfort level:   Encourage patient to monitor pain and request assistance   Assess pain using appropriate pain scale

## 2025-04-17 NOTE — DISCHARGE SUMMARY
V2.0  Discharge Summary    Name:  Simi Iglesias /Age/Sex: 1975 (50 y.o. female)   Admit Date: 4/15/2025  Discharge Date: 25    MRN & CSN:  [id number] & [id number] Encounter Date and Time 25 7:02 PM EDT    Attending:  No att. providers found Discharging Provider: Paul Sterling DO       Hospital Course:     Brief HPI: Simi Iglesias is a 50 y.o. female who presented with a complaint of chest pressure and palpitations. Patient started having an episode of palpitation associated with chest pressure was started out probably earlier today. Patient denies any dizziness or loss of consciousness. Patient denies any shortness of breath nausea or vomiting. Patient denies any recent similar episodes. She did report that she had a similar episode about 5 years ago. Patient denies any excessive use of caffeinated drinks. In ED patient was found to be in supraventricular tachycardia. Patient received IV adenosine 6 mg with conversion of the SVT to normal sinus rhythm. Patient currently feels better and denies any current chest pain or difficulty breathing. In ED labs were remarkable for progressively elevated troponin at 10, 26, 40. Patient is currently assigned to observation status for close monitoring and further evaluation     Brief Problem Based Course:     Chest pressure  SVT  Elevated troponin, likely secondary to type II MI related to SVT  - Patient presented with reports of palpitations associated with chest pressure. No other associated symptoms such as nausea/vomiting, dizziness, SOB, LOC. Had similar episode about 5 years ago  - EKG on arrival showed SVT. Received 6mg IV adenosine which did convert her out of the rhythm  - Troponin initially trended upwards, peaked at 40, has since been downtrending  - TSH normal at 1.50. K normal at 3.9.   - Echo ordered, EF 55-60%, normal wall motion, G1DD. Cardiology consulted, appreciate their recommendations.   - Stress test done was normal, low risk.

## 2025-04-21 NOTE — PROGRESS NOTES
you for allowing me to participate in the care of Simi Iglesias. All questions and concerns were addressed to the patient/family. Alternatives to my treatment were discussed.     I, Jennie Currie RN, am scribing for and in the presence of Dr. Julio Jennings. 04/22/25 11:29 AM  CHAI Santiago, Jelena Jennings MD, personally performed the services prescribed in this documentation as scribed by Ms. Jennie Currie RN in my presence and it is both accurate and complete.       Jelena Jennings MD  Cardiac Electrophysiology  [facility]

## 2025-04-22 ENCOUNTER — OFFICE VISIT (OUTPATIENT)
Dept: CARDIOLOGY CLINIC | Age: 50
End: 2025-04-22

## 2025-04-22 VITALS
HEART RATE: 61 BPM | BODY MASS INDEX: 29.89 KG/M2 | OXYGEN SATURATION: 97 % | WEIGHT: 201.8 LBS | HEIGHT: 69 IN | SYSTOLIC BLOOD PRESSURE: 112 MMHG | DIASTOLIC BLOOD PRESSURE: 90 MMHG

## 2025-04-22 DIAGNOSIS — I47.10 SVT (SUPRAVENTRICULAR TACHYCARDIA): Primary | ICD-10-CM

## 2025-04-22 DIAGNOSIS — I10 HYPERTENSION, UNSPECIFIED TYPE: ICD-10-CM

## 2025-04-22 PROCEDURE — 99214 OFFICE O/P EST MOD 30 MIN: CPT | Performed by: INTERNAL MEDICINE

## 2025-04-22 PROCEDURE — 3080F DIAST BP >= 90 MM HG: CPT | Performed by: INTERNAL MEDICINE

## 2025-04-22 PROCEDURE — 93000 ELECTROCARDIOGRAM COMPLETE: CPT | Performed by: INTERNAL MEDICINE

## 2025-04-22 PROCEDURE — 3074F SYST BP LT 130 MM HG: CPT | Performed by: INTERNAL MEDICINE

## 2025-04-22 PROCEDURE — G2211 COMPLEX E/M VISIT ADD ON: HCPCS | Performed by: INTERNAL MEDICINE

## 2025-04-22 NOTE — PATIENT INSTRUCTIONS
Electrophysiology Study (EPS) and Catheter Ablation    Date of Procedure:     Time of Arrival:     Cardiologist performing procedure: Dr. Bonds    Arrive at [facility] through the main entrance.  Check in at the Outpatient Diagnostic desk on the 1st floor.    Do not eat or drink anything after midnight the night before the procedure. You may brush your teeth and rinse the morning of the procedure.    Please hold diltiazem for 2 days prior to procedure.    Take all your other routine medications the morning of the procedure.    Lab work is due within a week of the procedure. You do not need to be fasting for these labs. This can be done at the [facility] Outpatient lab at [address]    Do not apply any lotion, powder, or deodorant the morning of the procedure.    Please bring a list of your medications to the hospital with you.    You must have someone available to drive you home.  It is recommended that you do not drive for 48 hours after the procedure.    You will need someone to stay with you at home the night of the procedure.    If you are unable to make this appointment, please call [facility], at [phone].